# Patient Record
Sex: MALE | Race: WHITE | Employment: FULL TIME | URBAN - NONMETROPOLITAN AREA
[De-identification: names, ages, dates, MRNs, and addresses within clinical notes are randomized per-mention and may not be internally consistent; named-entity substitution may affect disease eponyms.]

---

## 2017-09-12 RX ORDER — AMOXICILLIN 500 MG/1
500 CAPSULE ORAL 2 TIMES DAILY
Qty: 20 CAPSULE | Refills: 0 | Status: SHIPPED | OUTPATIENT
Start: 2017-09-12 | End: 2017-09-22

## 2017-11-24 ENCOUNTER — HOSPITAL ENCOUNTER (EMERGENCY)
Age: 47
Discharge: HOME | End: 2017-11-24
Payer: COMMERCIAL

## 2017-11-24 VITALS
TEMPERATURE: 98.2 F | HEART RATE: 115 BPM | DIASTOLIC BLOOD PRESSURE: 71 MMHG | WEIGHT: 180 LBS | RESPIRATION RATE: 18 BRPM | SYSTOLIC BLOOD PRESSURE: 140 MMHG | BODY MASS INDEX: 28.25 KG/M2 | HEIGHT: 67 IN | OXYGEN SATURATION: 98 %

## 2017-11-24 DIAGNOSIS — J02.0 STREP PHARYNGITIS: Primary | ICD-10-CM

## 2017-11-24 LAB
GROUP A STREP CULTURE, REFLEX: POSITIVE
REFLEX THROAT C + S: ABNORMAL

## 2017-11-24 PROCEDURE — 87880 STREP A ASSAY W/OPTIC: CPT

## 2017-11-24 PROCEDURE — 99214 OFFICE O/P EST MOD 30 MIN: CPT

## 2017-11-24 PROCEDURE — 99213 OFFICE O/P EST LOW 20 MIN: CPT | Performed by: NURSE PRACTITIONER

## 2017-11-24 RX ORDER — AMOXICILLIN 500 MG/1
500 CAPSULE ORAL 2 TIMES DAILY
Qty: 20 CAPSULE | Refills: 0 | Status: SHIPPED | OUTPATIENT
Start: 2017-11-24 | End: 2017-12-04

## 2017-11-24 ASSESSMENT — ENCOUNTER SYMPTOMS
SINUS PAIN: 0
DIARRHEA: 0
EYE DISCHARGE: 0
SORE THROAT: 1
RHINORRHEA: 0
NAUSEA: 0
VOMITING: 0
TROUBLE SWALLOWING: 1
COUGH: 0
CONSTIPATION: 0
ABDOMINAL PAIN: 0
SHORTNESS OF BREATH: 0
SINUS PRESSURE: 0
SINUS CONGESTION: 0

## 2017-11-24 ASSESSMENT — PAIN DESCRIPTION - PAIN TYPE: TYPE: ACUTE PAIN

## 2017-11-24 ASSESSMENT — PAIN DESCRIPTION - DESCRIPTORS: DESCRIPTORS: SORE

## 2017-11-24 ASSESSMENT — PAIN SCALES - GENERAL: PAINLEVEL_OUTOF10: 8

## 2017-11-24 ASSESSMENT — PAIN DESCRIPTION - LOCATION: LOCATION: THROAT

## 2018-02-13 NOTE — ED PROVIDER NOTES
[facility]  Urgent Care Encounter       CHIEF COMPLAINT       Chief Complaint   Patient presents with    Pharyngitis     x3 days       Nurses Notes reviewed and I agree except as noted in the HPI. HISTORY OF PRESENT ILLNESS   Eliu Trujillo is a 56 y.o. male who presents       Pharyngitis   Location:  Generalized  Quality:  Sore  Duration:  3 days  Timing:  Constant  Progression:  Worsening  Chronicity:  New  Relieved by:  Nothing  Worsened by:  Eating and swallowing  Associated symptoms: chills, fever, headaches and trouble swallowing    Associated symptoms: no abdominal pain, no chest pain, no cough, no ear discharge, no ear pain, no eye discharge, no postnasal drip, no rash, no rhinorrhea, no shortness of breath and no sinus congestion    Risk factors: exposure to strep        REVIEW OF SYSTEMS     Review of Systems   Constitutional: Positive for chills, fatigue and fever. Negative for activity change and appetite change. HENT: Positive for sore throat and trouble swallowing. Negative for ear discharge, ear pain, postnasal drip, rhinorrhea, sinus pain and sinus pressure. Eyes: Negative for discharge. Respiratory: Negative for cough and shortness of breath. Cardiovascular: Negative for chest pain. Gastrointestinal: Negative for abdominal pain, constipation, diarrhea, nausea and vomiting. Musculoskeletal: Negative for myalgias and neck pain. Skin: Negative for rash. Neurological: Positive for headaches. Negative for light-headedness. PAST MEDICAL HISTORY         Diagnosis Date    Chalazion     Sinusitis        SURGICAL HISTORY     Patient  has a past surgical history that includes Adenoidectomy and rhinoplasty.     CURRENT MEDICATIONS       Previous Medications    ACETAMINOPHEN (TYLENOL) 325 MG TABLET    Take 650 mg by mouth every 6 hours as needed for Pain    ASPIRIN 325 MG TABLET    Take 325 mg by mouth daily       ALLERGIES     Patient is is allergic to sulfa occasionally words are mis-transcribed.)           Thang Joaquin, CNP  11/24/17 1317       Briannanaveed Gianni, CNP  12/13/17 0908       Briannanaveed [place], [facility]  12/27/17 [address], CNP  01/06/18 1303       [facility] Clarenaveed [place], CNP  01/13/18 [address], [facility]  02/12/18 1937

## 2018-03-20 ENCOUNTER — HOSPITAL ENCOUNTER (EMERGENCY)
Age: 48
Discharge: HOME | End: 2018-03-20
Payer: COMMERCIAL

## 2018-03-20 VITALS
HEIGHT: 67 IN | OXYGEN SATURATION: 97 % | DIASTOLIC BLOOD PRESSURE: 80 MMHG | BODY MASS INDEX: 26.68 KG/M2 | HEART RATE: 74 BPM | SYSTOLIC BLOOD PRESSURE: 130 MMHG | TEMPERATURE: 97.8 F | RESPIRATION RATE: 16 BRPM | WEIGHT: 170 LBS

## 2018-03-20 DIAGNOSIS — H18.821 CORNEAL ABRASION OF RIGHT EYE DUE TO CONTACT LENS: Primary | ICD-10-CM

## 2018-03-20 PROCEDURE — 6370000000 HC RX 637 (ALT 250 FOR IP): Performed by: NURSE PRACTITIONER

## 2018-03-20 PROCEDURE — 99212 OFFICE O/P EST SF 10 MIN: CPT

## 2018-03-20 PROCEDURE — 99214 OFFICE O/P EST MOD 30 MIN: CPT | Performed by: NURSE PRACTITIONER

## 2018-03-20 RX ORDER — GENTAMICIN SULFATE 3 MG/ML
1 SOLUTION/ DROPS OPHTHALMIC EVERY 4 HOURS
Qty: 1 BOTTLE | Refills: 0 | Status: SHIPPED | OUTPATIENT
Start: 2018-03-20 | End: 2018-03-27 | Stop reason: HOSPADM

## 2018-03-20 RX ORDER — PROPARACAINE HYDROCHLORIDE 5 MG/ML
1 SOLUTION/ DROPS OPHTHALMIC ONCE
Status: COMPLETED | OUTPATIENT
Start: 2018-03-20 | End: 2018-03-20

## 2018-03-20 RX ORDER — SOFT LENS RINSE,STORE SOLUTION
1 SOLUTION, NON-ORAL MISCELLANEOUS ONCE
Status: COMPLETED | OUTPATIENT
Start: 2018-03-20 | End: 2018-03-20

## 2018-03-20 RX ADMIN — Medication 1 DROP: at 12:21

## 2018-03-20 RX ADMIN — PROPARACAINE HYDROCHLORIDE 1 DROP: 5 SOLUTION/ DROPS OPHTHALMIC at 12:21

## 2018-03-20 ASSESSMENT — ENCOUNTER SYMPTOMS
RESPIRATORY NEGATIVE: 1
EYE REDNESS: 1
EYE DISCHARGE: 1
EYE ITCHING: 0
EYE PAIN: 0
PHOTOPHOBIA: 0

## 2018-03-20 ASSESSMENT — PAIN DESCRIPTION - LOCATION: LOCATION: EYE

## 2018-03-20 ASSESSMENT — PAIN DESCRIPTION - ORIENTATION: ORIENTATION: RIGHT

## 2018-03-20 ASSESSMENT — PAIN SCALES - GENERAL: PAINLEVEL_OUTOF10: 3

## 2018-03-27 ENCOUNTER — OFFICE VISIT (OUTPATIENT)
Dept: FAMILY MEDICINE CLINIC | Age: 48
End: 2018-03-27
Payer: COMMERCIAL

## 2018-03-27 VITALS
HEART RATE: 80 BPM | DIASTOLIC BLOOD PRESSURE: 74 MMHG | SYSTOLIC BLOOD PRESSURE: 132 MMHG | WEIGHT: 174.8 LBS | BODY MASS INDEX: 27.38 KG/M2

## 2018-03-27 DIAGNOSIS — S05.01XD ABRASION OF RIGHT CORNEA, SUBSEQUENT ENCOUNTER: Primary | ICD-10-CM

## 2018-03-27 PROCEDURE — G8427 DOCREV CUR MEDS BY ELIG CLIN: HCPCS | Performed by: FAMILY MEDICINE

## 2018-03-27 PROCEDURE — 1036F TOBACCO NON-USER: CPT | Performed by: FAMILY MEDICINE

## 2018-03-27 PROCEDURE — 99212 OFFICE O/P EST SF 10 MIN: CPT | Performed by: FAMILY MEDICINE

## 2018-03-27 PROCEDURE — G8419 CALC BMI OUT NRM PARAM NOF/U: HCPCS | Performed by: FAMILY MEDICINE

## 2018-03-27 PROCEDURE — G8484 FLU IMMUNIZE NO ADMIN: HCPCS | Performed by: FAMILY MEDICINE

## 2018-03-27 NOTE — PROGRESS NOTES
Name:  Nasir Dawson  :    1970    Chief Complaint   Patient presents with    ED Follow-up     [facility] ER 18- Corneal Scratch Right eye, 5 o'clock position       HPI:  Follow up to right corneal abrasion from contact use. Was to UC and given Gentamicin drops. Quickly better. No contacts for 1 week now. No vision issues. No pain. Physical Exam:      /74 (Site: Right Arm, Position: Sitting, Cuff Size: Medium Adult)   Pulse 80   Wt 174 lb 12.8 oz (79.3 kg)   BMI 27.38 kg/m²     Eyes look normal.  Limbus clear. No matter. Stain has no uptake. Assessment/Plan:      Right corneal abrasion. Stop drops. May use contacts. There are no diagnoses linked to this encounter.

## 2023-03-03 ENCOUNTER — HOSPITAL ENCOUNTER (EMERGENCY)
Age: 53
Discharge: ANOTHER HOSPITAL | End: 2023-03-03
Payer: COMMERCIAL

## 2023-03-03 VITALS
DIASTOLIC BLOOD PRESSURE: 86 MMHG | OXYGEN SATURATION: 96 % | TEMPERATURE: 98.7 F | RESPIRATION RATE: 20 BRPM | HEART RATE: 142 BPM | WEIGHT: 167 LBS | HEIGHT: 67 IN | BODY MASS INDEX: 26.21 KG/M2 | SYSTOLIC BLOOD PRESSURE: 146 MMHG

## 2023-03-03 DIAGNOSIS — J02.9 ACUTE PHARYNGITIS, UNSPECIFIED ETIOLOGY: ICD-10-CM

## 2023-03-03 DIAGNOSIS — R00.0 TACHYCARDIA: Primary | ICD-10-CM

## 2023-03-03 LAB
EKG ATRIAL RATE: 154 BPM
EKG P AXIS: 54 DEGREES
EKG P-R INTERVAL: 134 MS
EKG Q-T INTERVAL: 312 MS
EKG QRS DURATION: 66 MS
EKG QTC CALCULATION (BAZETT): 499 MS
EKG R AXIS: 14 DEGREES
EKG T AXIS: 62 DEGREES
EKG VENTRICULAR RATE: 154 BPM
FLUAV AG SPEC QL: NEGATIVE
FLUBV AG SPEC QL: NEGATIVE
SARS-COV-2 RDRP RESP QL NAA+PROBE: NOT  DETECTED

## 2023-03-03 PROCEDURE — 87635 SARS-COV-2 COVID-19 AMP PRB: CPT

## 2023-03-03 PROCEDURE — 99213 OFFICE O/P EST LOW 20 MIN: CPT | Performed by: NURSE PRACTITIONER

## 2023-03-03 PROCEDURE — 99215 OFFICE O/P EST HI 40 MIN: CPT

## 2023-03-03 PROCEDURE — 93005 ELECTROCARDIOGRAM TRACING: CPT | Performed by: NURSE PRACTITIONER

## 2023-03-03 PROCEDURE — 87804 INFLUENZA ASSAY W/OPTIC: CPT

## 2023-03-03 PROCEDURE — 93010 ELECTROCARDIOGRAM REPORT: CPT | Performed by: INTERNAL MEDICINE

## 2023-03-03 RX ORDER — AMOXICILLIN AND CLAVULANATE POTASSIUM 875; 125 MG/1; MG/1
1 TABLET, FILM COATED ORAL 2 TIMES DAILY
Qty: 20 TABLET | Refills: 0 | Status: SHIPPED | OUTPATIENT
Start: 2023-03-03 | End: 2023-03-03 | Stop reason: ALTCHOICE

## 2023-03-03 ASSESSMENT — PAIN DESCRIPTION - DESCRIPTORS: DESCRIPTORS: BURNING

## 2023-03-03 ASSESSMENT — ENCOUNTER SYMPTOMS
DIARRHEA: 0
RHINORRHEA: 0
NAUSEA: 0
VOMITING: 0
SORE THROAT: 1
COUGH: 1
SHORTNESS OF BREATH: 0
CHEST TIGHTNESS: 0

## 2023-03-03 ASSESSMENT — PAIN DESCRIPTION - FREQUENCY: FREQUENCY: INTERMITTENT

## 2023-03-03 ASSESSMENT — PAIN DESCRIPTION - LOCATION: LOCATION: THROAT

## 2023-03-03 ASSESSMENT — PAIN DESCRIPTION - PAIN TYPE: TYPE: ACUTE PAIN

## 2023-03-03 ASSESSMENT — PAIN SCALES - GENERAL: PAINLEVEL_OUTOF10: 7

## 2023-03-03 ASSESSMENT — PAIN DESCRIPTION - ONSET: ONSET: GRADUAL

## 2023-03-03 ASSESSMENT — PAIN - FUNCTIONAL ASSESSMENT: PAIN_FUNCTIONAL_ASSESSMENT: 0-10

## 2023-03-03 NOTE — ED PROVIDER NOTES
[facility]  Urgent Care Encounter       CHIEF COMPLAINT       Chief Complaint   Patient presents with    Cough    Pharyngitis    Fatigue       Nurses Notes reviewed and I agree except as noted in the HPI. HISTORY OF PRESENT ILLNESS   Torin Mera is a 47 y.o. male who presents to the [facility] urgent care for evaluation of cough and pharyngitis. Patient reports symptoms started yesterday. He reports associated symptoms of fatigue and myalgia. He denies known exposure to someone ill. Denies chest pain, dyspnea, or palpitations. The history is provided by the patient. No  was used. REVIEW OF SYSTEMS     Review of Systems   Constitutional:  Positive for fatigue. Negative for activity change, appetite change, chills and fever. HENT:  Positive for sore throat. Negative for ear discharge, ear pain and rhinorrhea. Respiratory:  Positive for cough. Negative for chest tightness and shortness of breath. Cardiovascular:  Negative for chest pain. Gastrointestinal:  Negative for diarrhea, nausea and vomiting. Genitourinary:  Negative for dysuria. Musculoskeletal:  Positive for myalgias. Skin:  Negative for rash. Allergic/Immunologic: Negative for environmental allergies and food allergies. Neurological:  Negative for dizziness and headaches. PAST MEDICAL HISTORY         Diagnosis Date    Chalazion     Sinusitis        SURGICALHISTORY     Patient  has a past surgical history that includes Adenoidectomy and rhinoplasty. CURRENT MEDICATIONS       Current Discharge Medication List        CONTINUE these medications which have NOT CHANGED    Details   aspirin 325 MG tablet Take 325 mg by mouth daily      acetaminophen (TYLENOL) 325 MG tablet Take 650 mg by mouth every 6 hours as needed for Pain             ALLERGIES     Patient is is allergic to sulfa antibiotics and doxycycline.     Patients   Immunization History   Administered Date(s) Administered Influenza Vaccine, unspecified formulation 10/18/2018       FAMILY HISTORY     Patient's family history includes Diabetes in his brother, father, and maternal grandfather; Heart Disease in his father. SOCIAL HISTORY     Patient  reports that he has quit smoking. He has never used smokeless tobacco. He reports current alcohol use. He reports that he does not use drugs. PHYSICAL EXAM     ED TRIAGE VITALS  BP: (!) 146/86, Temp: 98.7 °F (37.1 °C), Heart Rate: (!) 142, Resp: 20, SpO2: 96 %,Estimated body mass index is 26.16 kg/m² as calculated from the following:    Height as of this encounter: 5' 7\" (1.702 m). Weight as of this encounter: 167 lb (75.8 kg). ,No LMP for male patient. Physical Exam  Vitals and nursing note reviewed. Constitutional:       General: He is not in acute distress. Appearance: Normal appearance. He is not ill-appearing, toxic-appearing or diaphoretic. HENT:      Head: Normocephalic. Right Ear: Ear canal and external ear normal.      Left Ear: Ear canal and external ear normal.      Nose: Nose normal. No congestion or rhinorrhea. Mouth/Throat:      Mouth: Mucous membranes are moist.      Pharynx: Pharyngeal swelling and posterior oropharyngeal erythema present. No oropharyngeal exudate. Cardiovascular:      Rate and Rhythm: Normal rate. Pulses: Normal pulses. Pulmonary:      Effort: Pulmonary effort is normal. No respiratory distress. Breath sounds: No stridor. No wheezing or rhonchi. Abdominal:      General: Abdomen is flat. Bowel sounds are normal.      Palpations: Abdomen is soft. Musculoskeletal:         General: No swelling or tenderness. Normal range of motion. Cervical back: Normal range of motion. Neurological:      General: No focal deficit present. Mental Status: He is alert and oriented to person, place, and time.    Psychiatric:         Mood and Affect: Mood normal.         Behavior: Behavior normal.       DIAGNOSTIC RESULTS Labs:  Results for orders placed or performed during the hospital encounter of 03/03/23   COVID-19, Rapid   Result Value Ref Range    SARS-CoV-2, ALEX NOT  DETECTED NOT DETECTED   Rapid influenza A/B antigens   Result Value Ref Range    Flu A Antigen NEGATIVE NEGATIVE    Influenza B Ag, EIA NEGATIVE NEGATIVE   EKG 12 Lead   Result Value Ref Range    Ventricular Rate 154 BPM    Atrial Rate 154 BPM    P-R Interval 134 ms    QRS Duration 66 ms    Q-T Interval 312 ms    QTc Calculation (Bazett) 499 ms    P Axis 54 degrees    R Axis 14 degrees    T Axis 62 degrees       IMAGING:    No orders to display         EKG: Sinus tachycardia with a ventricular rate of 154. Parable 134, QRS 66, , and QTc 499. No ST elevation noted. URGENT CARE COURSE:     Vitals:    03/03/23 1339   BP: (!) 146/86   Pulse: (!) 142   Resp: 20   Temp: 98.7 °F (37.1 °C)   TempSrc: Temporal   SpO2: 96%   Weight: 167 lb (75.8 kg)   Height: 5' 7\" (1.702 m)       Medications - No data to display         PROCEDURES:  None    FINAL IMPRESSION      1. Tachycardia    2. Acute pharyngitis, unspecified etiology          DISPOSITION/ PLAN     Patient seen and evaluated for cough and pharyngitis. A rapid influenza and COVID were obtained and negative. We did attempt to obtain a strep swab, but the patient did not tolerate. Patient was noted to have a heart rate between 140 and 150s while at rest.  An EKG was completed revealing sinus tachycardia with no ST elevation. I did offer to provide IV fluid while at urgent care or transfer to the emergency department. Patient requesting to be transferred to [facility].  I did discuss the case with [facility] nurse practitioner who is covering for Dr. Martha Anguiano, who graciously excepts the patient. Patient will be transferred via private vehicle in stable condition. Patient is agreeable to the above plan and denies questions or concerns at this time.       PATIENT REFERRED TO:  Chyna Stanley  [id number] [facility] [id number]      DISCHARGE MEDICATIONS:  Current Discharge Medication List          Current Discharge Medication List          Current Discharge Medication List          JANEEN Camacho CNP    (Please note that portions of this note were completed with a voice recognition program. Efforts were made to edit the dictations but occasionally words are mis-transcribed.)            JANEEN Camacho CNP  03/03/23 2978

## 2023-03-03 NOTE — Clinical Note
Jeffery Rosario was seen and treated in our emergency department on 3/3/2023. He may return to work on 03/05/2023. May be off school or work one to two days if needed. Should also be fever free 24 hours before returning to work or school. If you have any questions or concerns, please don't hesitate to call.       Reese Delgado, JANEEN - CNP

## 2023-04-01 LAB
EKG ATRIAL RATE: 154 BPM
EKG P AXIS: 54 DEGREES
EKG P-R INTERVAL: 134 MS
EKG Q-T INTERVAL: 312 MS
EKG QRS DURATION: 66 MS
EKG QTC CALCULATION (BAZETT): 499 MS
EKG R AXIS: 14 DEGREES
EKG T AXIS: 62 DEGREES
EKG VENTRICULAR RATE: 154 BPM